# Patient Record
Sex: FEMALE | Race: WHITE | Employment: FULL TIME | URBAN - METROPOLITAN AREA
[De-identification: names, ages, dates, MRNs, and addresses within clinical notes are randomized per-mention and may not be internally consistent; named-entity substitution may affect disease eponyms.]

---

## 2023-07-25 ENCOUNTER — HOSPITAL ENCOUNTER (EMERGENCY)
Age: 28
Discharge: HOME | End: 2023-07-25
Attending: EMERGENCY MEDICINE
Payer: COMMERCIAL

## 2023-07-25 ENCOUNTER — APPOINTMENT (OUTPATIENT)
Dept: CT IMAGING | Age: 28
End: 2023-07-25
Payer: COMMERCIAL

## 2023-07-25 VITALS
TEMPERATURE: 98.4 F | BODY MASS INDEX: 32.22 KG/M2 | SYSTOLIC BLOOD PRESSURE: 128 MMHG | WEIGHT: 193.4 LBS | HEART RATE: 76 BPM | DIASTOLIC BLOOD PRESSURE: 80 MMHG | RESPIRATION RATE: 20 BRPM | HEIGHT: 65 IN | OXYGEN SATURATION: 99 %

## 2023-07-25 DIAGNOSIS — R10.11 RIGHT UPPER QUADRANT ABDOMINAL PAIN: Primary | ICD-10-CM

## 2023-07-25 LAB
ALBUMIN SERPL-MCNC: 3.5 G/DL (ref 3.5–5.2)
ALP SERPL-CCNC: 76 U/L (ref 35–104)
ALT SERPL-CCNC: 29 U/L (ref 0–32)
ANION GAP SERPL CALCULATED.3IONS-SCNC: 12 MMOL/L (ref 7–16)
AST SERPL-CCNC: 14 U/L (ref 0–31)
BACTERIA URNS QL MICRO: ABNORMAL
BASOPHILS # BLD: 0.05 K/UL (ref 0–0.2)
BASOPHILS NFR BLD: 1 % (ref 0–2)
BILIRUB SERPL-MCNC: <0.2 MG/DL (ref 0–1.2)
BILIRUB UR QL STRIP: NEGATIVE
BUN SERPL-MCNC: 6 MG/DL (ref 6–20)
CALCIUM SERPL-MCNC: 9 MG/DL (ref 8.6–10.2)
CHLORIDE SERPL-SCNC: 104 MMOL/L (ref 98–107)
CLARITY UR: CLEAR
CO2 SERPL-SCNC: 22 MMOL/L (ref 22–29)
COLOR UR: YELLOW
CREAT SERPL-MCNC: 0.6 MG/DL (ref 0.5–1)
D DIMER: 404 NG/ML DDU (ref 0–232)
EOSINOPHIL # BLD: 0.15 K/UL (ref 0.05–0.5)
EOSINOPHILS RELATIVE PERCENT: 2 % (ref 0–6)
EPI CELLS #/AREA URNS HPF: ABNORMAL /HPF
ERYTHROCYTE [DISTWIDTH] IN BLOOD BY AUTOMATED COUNT: 13.6 % (ref 11.5–15)
GFR SERPL CREATININE-BSD FRML MDRD: >60 ML/MIN/1.73M2
GLUCOSE SERPL-MCNC: 82 MG/DL (ref 74–99)
GLUCOSE UR STRIP-MCNC: NEGATIVE MG/DL
HCT VFR BLD AUTO: 31.3 % (ref 34–48)
HGB BLD-MCNC: 10.5 G/DL (ref 11.5–15.5)
HGB UR QL STRIP.AUTO: NEGATIVE
IMM GRANULOCYTES # BLD AUTO: 0.11 K/UL (ref 0–0.58)
IMM GRANULOCYTES NFR BLD: 1 % (ref 0–5)
KETONES UR STRIP-MCNC: NEGATIVE MG/DL
LEUKOCYTE ESTERASE UR QL STRIP: NEGATIVE
LIPASE SERPL-CCNC: 31 U/L (ref 13–60)
LYMPHOCYTES NFR BLD: 2.21 K/UL (ref 1.5–4)
LYMPHOCYTES RELATIVE PERCENT: 22 % (ref 20–42)
MCH RBC QN AUTO: 29 PG (ref 26–35)
MCHC RBC AUTO-ENTMCNC: 33.5 G/DL (ref 32–34.5)
MCV RBC AUTO: 86.5 FL (ref 80–99.9)
MONOCYTES NFR BLD: 0.76 K/UL (ref 0.1–0.95)
MONOCYTES NFR BLD: 8 % (ref 2–12)
NEUTROPHILS NFR BLD: 68 % (ref 43–80)
NEUTS SEG NFR BLD: 6.88 K/UL (ref 1.8–7.3)
NITRITE UR QL STRIP: NEGATIVE
PH UR STRIP: 6.5 [PH] (ref 5–9)
PLATELET # BLD AUTO: 211 K/UL (ref 130–450)
PMV BLD AUTO: 10 FL (ref 7–12)
POTASSIUM SERPL-SCNC: 3.7 MMOL/L (ref 3.5–5)
PROT SERPL-MCNC: 6.9 G/DL (ref 6.4–8.3)
PROT UR STRIP-MCNC: NEGATIVE MG/DL
RBC # BLD AUTO: 3.62 M/UL (ref 3.5–5.5)
RBC #/AREA URNS HPF: ABNORMAL /HPF
SODIUM SERPL-SCNC: 138 MMOL/L (ref 132–146)
SP GR UR STRIP: <1.005 (ref 1–1.03)
UROBILINOGEN UR STRIP-ACNC: 0.2 EU/DL (ref 0–1)
WBC #/AREA URNS HPF: ABNORMAL /HPF
WBC OTHER # BLD: 10.2 K/UL (ref 4.5–11.5)

## 2023-07-25 PROCEDURE — 71275 CT ANGIOGRAPHY CHEST: CPT

## 2023-07-25 PROCEDURE — 85379 FIBRIN DEGRADATION QUANT: CPT

## 2023-07-25 PROCEDURE — 83690 ASSAY OF LIPASE: CPT

## 2023-07-25 PROCEDURE — 81001 URINALYSIS AUTO W/SCOPE: CPT

## 2023-07-25 PROCEDURE — 80053 COMPREHEN METABOLIC PANEL: CPT

## 2023-07-25 PROCEDURE — 6360000004 HC RX CONTRAST MEDICATION: Performed by: RADIOLOGY

## 2023-07-25 PROCEDURE — 99285 EMERGENCY DEPT VISIT HI MDM: CPT

## 2023-07-25 PROCEDURE — 85027 COMPLETE CBC AUTOMATED: CPT

## 2023-07-25 RX ORDER — LIDOCAINE 50 MG/G
1 PATCH TOPICAL DAILY
Qty: 10 PATCH | Refills: 0 | Status: SHIPPED | OUTPATIENT
Start: 2023-07-25 | End: 2023-08-04

## 2023-07-25 RX ORDER — MULTIVIT-MIN/IRON/FOLIC ACID/K 18-600-40
CAPSULE ORAL
COMMUNITY

## 2023-07-25 RX ADMIN — IOPAMIDOL 75 ML: 755 INJECTION, SOLUTION INTRAVENOUS at 20:36

## 2023-07-25 ASSESSMENT — PAIN DESCRIPTION - LOCATION: LOCATION: BACK;BREAST

## 2023-07-25 ASSESSMENT — PAIN DESCRIPTION - DESCRIPTORS: DESCRIPTORS: ACHING;DISCOMFORT;SORE

## 2023-07-25 ASSESSMENT — PAIN DESCRIPTION - ORIENTATION: ORIENTATION: RIGHT

## 2023-07-25 ASSESSMENT — PAIN - FUNCTIONAL ASSESSMENT: PAIN_FUNCTIONAL_ASSESSMENT: 0-10

## 2023-07-25 ASSESSMENT — PAIN SCALES - GENERAL: PAINLEVEL_OUTOF10: 8

## 2023-07-25 NOTE — ED PROVIDER NOTES
nursing note reviewed. Constitutional:       General: She is not in acute distress. Appearance: She is not toxic-appearing. HENT:      Mouth/Throat:      Mouth: Mucous membranes are moist.   Eyes:      General: No scleral icterus. Extraocular Movements: Extraocular movements intact. Pupils: Pupils are equal, round, and reactive to light. Cardiovascular:      Rate and Rhythm: Normal rate and regular rhythm. Pulses: Normal pulses. Heart sounds: Normal heart sounds. No murmur heard. Pulmonary:      Effort: Pulmonary effort is normal. No respiratory distress. Breath sounds: Normal breath sounds. No wheezing or rales. Abdominal:      General: There is no distension. Palpations: Abdomen is soft. Tenderness: There is abdominal tenderness (RUQ). There is no right CVA tenderness, left CVA tenderness, guarding or rebound. Musculoskeletal:         General: Tenderness present. No swelling. Normal range of motion. Cervical back: Normal range of motion and neck supple. Comments: Right thoracic paraspinal ttp which also reproduces RUQ pain. No midline ttp. Skin:     General: Skin is warm and dry. Neurological:      Mental Status: She is alert and oriented to person, place, and time. Comments: Moves all extremities with appropriate strength. Sensation grossly intact in all extremities. -------------------------------------------------- RESULTS -------------------------------------------------  I have personally reviewed all laboratory and imaging results for this patient. Results are listed below.      LABS:  Labs Reviewed   CBC WITH AUTO DIFFERENTIAL - Abnormal; Notable for the following components:       Result Value    Hemoglobin 10.5 (*)     Hematocrit 31.3 (*)     All other components within normal limits   URINALYSIS WITH MICROSCOPIC - Abnormal; Notable for the following components:    Specific Gravity, UA <1.005 (*)     All other components within

## 2023-07-26 NOTE — ED NOTES
Name: Eduardo Reece  : 1995  MRN: [id number]    Date: 2023    Benefits of immediately proceeding with Radiology exam outweigh the risks and therefore the following is being waived:      [x] Pregnancy test    [] Protocol for Iodine allergy    [] MRI questionnaire    [] BUN/Creatinine        MD Sebastien Garrett MD  23

## 2023-07-26 NOTE — DISCHARGE INSTRUCTIONS
Return the emergency department if you have worsening pain, vomiting, unable to keep down food or drink, fever, black or bloody stool, vaginal bleeding or fluid leakage, or any other new or concerning symptoms. Follow-up with your OBGYN at the next available appointment.

## 2023-08-30 DIAGNOSIS — D61.9 APLASTIC ANEMIA SECONDARY TO PREGNANCY, ANTEPARTUM (HCC): ICD-10-CM

## 2023-08-30 DIAGNOSIS — O99.019 APLASTIC ANEMIA SECONDARY TO PREGNANCY, ANTEPARTUM (HCC): ICD-10-CM

## 2023-08-30 RX ORDER — ONDANSETRON 2 MG/ML
8 INJECTION INTRAMUSCULAR; INTRAVENOUS
OUTPATIENT
Start: 2023-08-31

## 2023-08-30 RX ORDER — ACETAMINOPHEN 325 MG/1
650 TABLET ORAL
OUTPATIENT
Start: 2023-08-31

## 2023-08-30 RX ORDER — HEPARIN 100 UNIT/ML
500 SYRINGE INTRAVENOUS PRN
OUTPATIENT
Start: 2023-08-31

## 2023-08-30 RX ORDER — SODIUM CHLORIDE 0.9 % (FLUSH) 0.9 %
5-40 SYRINGE (ML) INJECTION PRN
OUTPATIENT
Start: 2023-08-31

## 2023-08-30 RX ORDER — SODIUM CHLORIDE 9 MG/ML
5-250 INJECTION, SOLUTION INTRAVENOUS PRN
OUTPATIENT
Start: 2023-08-31

## 2023-08-30 RX ORDER — SODIUM CHLORIDE 9 MG/ML
INJECTION, SOLUTION INTRAVENOUS CONTINUOUS
OUTPATIENT
Start: 2023-08-31

## 2023-08-30 RX ORDER — EPINEPHRINE 1 MG/ML
0.3 INJECTION, SOLUTION, CONCENTRATE INTRAVENOUS PRN
OUTPATIENT
Start: 2023-08-31

## 2023-08-30 RX ORDER — DIPHENHYDRAMINE HYDROCHLORIDE 50 MG/ML
50 INJECTION INTRAMUSCULAR; INTRAVENOUS
OUTPATIENT
Start: 2023-08-31

## 2023-09-15 ENCOUNTER — HOSPITAL ENCOUNTER (OUTPATIENT)
Dept: INFUSION THERAPY | Age: 28
Setting detail: INFUSION SERIES
Discharge: HOME | End: 2023-09-15
Payer: COMMERCIAL

## 2023-09-15 VITALS
DIASTOLIC BLOOD PRESSURE: 78 MMHG | RESPIRATION RATE: 18 BRPM | OXYGEN SATURATION: 99 % | SYSTOLIC BLOOD PRESSURE: 145 MMHG | HEART RATE: 79 BPM | TEMPERATURE: 98 F

## 2023-09-15 DIAGNOSIS — D61.9 APLASTIC ANEMIA SECONDARY TO PREGNANCY, ANTEPARTUM (HCC): Primary | ICD-10-CM

## 2023-09-15 DIAGNOSIS — O99.019 APLASTIC ANEMIA SECONDARY TO PREGNANCY, ANTEPARTUM (HCC): Primary | ICD-10-CM

## 2023-09-15 PROCEDURE — 2580000003 HC RX 258: Performed by: OBSTETRICS & GYNECOLOGY

## 2023-09-15 PROCEDURE — 96361 HYDRATE IV INFUSION ADD-ON: CPT

## 2023-09-15 PROCEDURE — 96365 THER/PROPH/DIAG IV INF INIT: CPT

## 2023-09-15 PROCEDURE — 6360000002 HC RX W HCPCS: Performed by: OBSTETRICS & GYNECOLOGY

## 2023-09-15 RX ORDER — SODIUM CHLORIDE 9 MG/ML
5-250 INJECTION, SOLUTION INTRAVENOUS PRN
Status: CANCELLED | OUTPATIENT
Start: 2023-09-22

## 2023-09-15 RX ORDER — HEPARIN SODIUM (PORCINE) LOCK FLUSH IV SOLN 100 UNIT/ML 100 UNIT/ML
500 SOLUTION INTRAVENOUS PRN
Status: CANCELLED | OUTPATIENT
Start: 2023-09-22

## 2023-09-15 RX ORDER — DIPHENHYDRAMINE HYDROCHLORIDE 50 MG/ML
50 INJECTION INTRAMUSCULAR; INTRAVENOUS
Status: CANCELLED | OUTPATIENT
Start: 2023-09-22

## 2023-09-15 RX ORDER — ACETAMINOPHEN 325 MG/1
650 TABLET ORAL
Status: CANCELLED | OUTPATIENT
Start: 2023-09-22

## 2023-09-15 RX ORDER — SODIUM CHLORIDE 9 MG/ML
5-250 INJECTION, SOLUTION INTRAVENOUS PRN
Status: DISCONTINUED | OUTPATIENT
Start: 2023-09-15 | End: 2023-09-16 | Stop reason: HOSPADM

## 2023-09-15 RX ORDER — FERROUS SULFATE 325(65) MG
325 TABLET ORAL
COMMUNITY

## 2023-09-15 RX ORDER — EPINEPHRINE 1 MG/ML
0.3 INJECTION, SOLUTION, CONCENTRATE INTRAVENOUS PRN
Status: CANCELLED | OUTPATIENT
Start: 2023-09-22

## 2023-09-15 RX ORDER — SODIUM CHLORIDE 0.9 % (FLUSH) 0.9 %
5-40 SYRINGE (ML) INJECTION PRN
Status: DISCONTINUED | OUTPATIENT
Start: 2023-09-15 | End: 2023-09-16 | Stop reason: HOSPADM

## 2023-09-15 RX ORDER — SODIUM CHLORIDE 9 MG/ML
INJECTION, SOLUTION INTRAVENOUS CONTINUOUS
Status: CANCELLED | OUTPATIENT
Start: 2023-09-22

## 2023-09-15 RX ORDER — ONDANSETRON 2 MG/ML
8 INJECTION INTRAMUSCULAR; INTRAVENOUS
Status: CANCELLED | OUTPATIENT
Start: 2023-09-22

## 2023-09-15 RX ORDER — SODIUM CHLORIDE 0.9 % (FLUSH) 0.9 %
5-40 SYRINGE (ML) INJECTION PRN
Status: CANCELLED | OUTPATIENT
Start: 2023-09-22

## 2023-09-15 RX ADMIN — SODIUM CHLORIDE, PRESERVATIVE FREE 10 ML: 5 INJECTION INTRAVENOUS at 11:50

## 2023-09-15 RX ADMIN — FERUMOXYTOL 510 MG: 510 INJECTION INTRAVENOUS at 12:03

## 2023-09-15 RX ADMIN — SODIUM CHLORIDE 5 ML/HR: 9 INJECTION, SOLUTION INTRAVENOUS at 11:51

## 2023-09-15 RX ADMIN — SODIUM CHLORIDE, PRESERVATIVE FREE 10 ML: 5 INJECTION INTRAVENOUS at 12:25

## 2023-09-22 ENCOUNTER — HOSPITAL ENCOUNTER (OUTPATIENT)
Dept: INFUSION THERAPY | Age: 28
Setting detail: INFUSION SERIES
End: 2023-09-22
Payer: COMMERCIAL

## 2023-09-22 VITALS
RESPIRATION RATE: 18 BRPM | OXYGEN SATURATION: 97 % | TEMPERATURE: 98 F | DIASTOLIC BLOOD PRESSURE: 71 MMHG | SYSTOLIC BLOOD PRESSURE: 122 MMHG | HEART RATE: 89 BPM

## 2023-09-22 DIAGNOSIS — O99.019 APLASTIC ANEMIA SECONDARY TO PREGNANCY, ANTEPARTUM (HCC): Primary | ICD-10-CM

## 2023-09-22 DIAGNOSIS — D61.9 APLASTIC ANEMIA SECONDARY TO PREGNANCY, ANTEPARTUM (HCC): Primary | ICD-10-CM

## 2023-09-22 PROCEDURE — 96365 THER/PROPH/DIAG IV INF INIT: CPT

## 2023-09-22 PROCEDURE — 6360000002 HC RX W HCPCS: Performed by: OBSTETRICS & GYNECOLOGY

## 2023-09-22 PROCEDURE — 2580000003 HC RX 258: Performed by: OBSTETRICS & GYNECOLOGY

## 2023-09-22 RX ORDER — HEPARIN SODIUM (PORCINE) LOCK FLUSH IV SOLN 100 UNIT/ML 100 UNIT/ML
500 SOLUTION INTRAVENOUS PRN
OUTPATIENT
Start: 2023-09-22

## 2023-09-22 RX ORDER — SODIUM CHLORIDE 9 MG/ML
5-250 INJECTION, SOLUTION INTRAVENOUS PRN
OUTPATIENT
Start: 2023-09-22

## 2023-09-22 RX ORDER — SODIUM CHLORIDE 9 MG/ML
INJECTION, SOLUTION INTRAVENOUS CONTINUOUS
OUTPATIENT
Start: 2023-09-22

## 2023-09-22 RX ORDER — EPINEPHRINE 1 MG/ML
0.3 INJECTION, SOLUTION, CONCENTRATE INTRAVENOUS PRN
OUTPATIENT
Start: 2023-09-22

## 2023-09-22 RX ORDER — SODIUM CHLORIDE 0.9 % (FLUSH) 0.9 %
5-40 SYRINGE (ML) INJECTION PRN
Status: DISCONTINUED | OUTPATIENT
Start: 2023-09-22 | End: 2023-09-23 | Stop reason: HOSPADM

## 2023-09-22 RX ORDER — SODIUM CHLORIDE 9 MG/ML
5-250 INJECTION, SOLUTION INTRAVENOUS PRN
Status: DISCONTINUED | OUTPATIENT
Start: 2023-09-22 | End: 2023-09-23 | Stop reason: HOSPADM

## 2023-09-22 RX ORDER — DIPHENHYDRAMINE HYDROCHLORIDE 50 MG/ML
50 INJECTION INTRAMUSCULAR; INTRAVENOUS
OUTPATIENT
Start: 2023-09-22

## 2023-09-22 RX ORDER — ACETAMINOPHEN 325 MG/1
650 TABLET ORAL
OUTPATIENT
Start: 2023-09-22

## 2023-09-22 RX ORDER — SODIUM CHLORIDE 0.9 % (FLUSH) 0.9 %
5-40 SYRINGE (ML) INJECTION PRN
OUTPATIENT
Start: 2023-09-22

## 2023-09-22 RX ORDER — ONDANSETRON 2 MG/ML
8 INJECTION INTRAMUSCULAR; INTRAVENOUS
OUTPATIENT
Start: 2023-09-22

## 2023-09-22 RX ORDER — SODIUM CHLORIDE 9 MG/ML
5-250 INJECTION, SOLUTION INTRAVENOUS PRN
Status: CANCELLED | OUTPATIENT
Start: 2023-09-22

## 2023-09-22 RX ADMIN — FERUMOXYTOL 510 MG: 510 INJECTION INTRAVENOUS at 12:15

## 2023-09-22 RX ADMIN — Medication 10 ML: at 11:59

## 2023-09-22 NOTE — DISCHARGE INSTRUCTIONS
affect ferumoxytol, including prescription and over-the-counter medicines, vitamins, and herbal products. Tell your doctor about all other medicines you use. Where can I get more information? Your pharmacist can provide more information about ferumoxytol. Remember, keep this and all other medicines out of the reach of children, never share your medicines with others, and use this medication only for the indication prescribed. Every effort has been made to ensure that the information provided by [address] [place] [address] [employer] [facility] is accurate, up-to-date, and complete, but no guarantee is made to that effect. Drug information contained herein may be time sensitive. [facility] information has been compiled for use by healthcare practitioners and consumers in the [facility] and therefore [facility] does not warrant that uses outside of the [facility] are appropriate, unless specifically indicated otherwise. [facility]'s drug information does not endorse drugs, diagnose patients or recommend therapy. [facility] drug information is an informational resource designed to assist licensed healthcare practitioners in caring for their patients and/or to serve consumers viewing this service as a supplement to, and not a substitute for, the expertise, skill, knowledge and judgment of healthcare practitioners. The absence of a warning for a given drug or drug combination in no way should be construed to indicate that the drug or drug combination is safe, effective or appropriate for any given patient. [facility] does not assume any responsibility for any aspect of healthcare administered with the aid of information [facility] provides. The information contained herein is not intended to cover all possible uses, directions, precautions, warnings, drug interactions, allergic reactions, or adverse effects.  If you have questions about the drugs you are taking, check with your doctor, nurse or pharmacist.  Copyright 8310-5009 [address] [place] [address] 304.

## 2023-10-23 ENCOUNTER — HOSPITAL ENCOUNTER (OUTPATIENT)
Age: 28
Discharge: HOME | End: 2023-10-23
Attending: OBSTETRICS & GYNECOLOGY | Admitting: OBSTETRICS & GYNECOLOGY
Payer: COMMERCIAL

## 2023-10-23 VITALS
RESPIRATION RATE: 16 BRPM | HEIGHT: 64 IN | WEIGHT: 245 LBS | TEMPERATURE: 98.8 F | HEART RATE: 89 BPM | OXYGEN SATURATION: 99 % | BODY MASS INDEX: 41.83 KG/M2 | DIASTOLIC BLOOD PRESSURE: 86 MMHG | SYSTOLIC BLOOD PRESSURE: 131 MMHG

## 2023-10-23 PROBLEM — Z3A.39 39 WEEKS GESTATION OF PREGNANCY: Status: ACTIVE | Noted: 2023-10-23

## 2023-10-23 LAB
AMNISURE, POC: NEGATIVE
Lab: NORMAL
NEGATIVE QC PASS/FAIL: NORMAL
POSITIVE QC PASS/FAIL: NORMAL

## 2023-10-23 PROCEDURE — 99202 OFFICE O/P NEW SF 15 MIN: CPT

## 2023-10-23 PROCEDURE — 84112 EVAL AMNIOTIC FLUID PROTEIN: CPT

## 2023-10-23 RX ORDER — OMEPRAZOLE 10 MG/1
10 CAPSULE, DELAYED RELEASE ORAL 2 TIMES DAILY
COMMUNITY

## 2023-10-23 NOTE — PROGRESS NOTES
Jeremiah Phillip notified of amnisure results. OK to d/c. Patient educated on signs of labor. Patient verbalized understanding.

## 2023-10-23 NOTE — PROGRESS NOTES
Patient arrived to unit c/o possible SROM on 10/21. NO bleeding or contractions noted. Positive fetal movement. Monitors applied. Amnisure performed. Call light in reach. Plan if care discussed.

## 2023-10-24 ENCOUNTER — ANESTHESIA (OUTPATIENT)
Dept: LABOR AND DELIVERY | Age: 28
End: 2023-10-24
Payer: COMMERCIAL

## 2023-10-24 ENCOUNTER — HOSPITAL ENCOUNTER (INPATIENT)
Age: 28
LOS: 1 days | Discharge: HOME | End: 2023-10-25
Attending: OBSTETRICS & GYNECOLOGY | Admitting: OBSTETRICS & GYNECOLOGY
Payer: COMMERCIAL

## 2023-10-24 ENCOUNTER — ANESTHESIA EVENT (OUTPATIENT)
Dept: LABOR AND DELIVERY | Age: 28
End: 2023-10-24
Payer: COMMERCIAL

## 2023-10-24 DIAGNOSIS — Z37.9 VACUUM-ASSISTED VAGINAL DELIVERY: Primary | ICD-10-CM

## 2023-10-24 LAB
ABO + RH BLD: NORMAL
ALBUMIN SERPL-MCNC: 3.3 G/DL (ref 3.5–5.2)
ALP SERPL-CCNC: 133 U/L (ref 35–104)
ALT SERPL-CCNC: 13 U/L (ref 0–32)
AMNISURE, POC: POSITIVE
AMPHET UR QL SCN: NEGATIVE
ANION GAP SERPL CALCULATED.3IONS-SCNC: 13 MMOL/L (ref 7–16)
ARM BAND NUMBER: NORMAL
AST SERPL-CCNC: 15 U/L (ref 0–31)
BARBITURATES UR QL SCN: NEGATIVE
BASOPHILS # BLD: 0.05 K/UL (ref 0–0.2)
BASOPHILS NFR BLD: 1 % (ref 0–2)
BENZODIAZ UR QL: NEGATIVE
BILIRUB SERPL-MCNC: <0.2 MG/DL (ref 0–1.2)
BLOOD BANK SAMPLE EXPIRATION: NORMAL
BLOOD GROUP ANTIBODIES SERPL: NEGATIVE
BUN SERPL-MCNC: 9 MG/DL (ref 6–20)
BUPRENORPHINE UR QL: NEGATIVE
CALCIUM SERPL-MCNC: 9.1 MG/DL (ref 8.6–10.2)
CANNABINOIDS UR QL SCN: NEGATIVE
CHLORIDE SERPL-SCNC: 106 MMOL/L (ref 98–107)
CO2 SERPL-SCNC: 18 MMOL/L (ref 22–29)
COCAINE UR QL SCN: NEGATIVE
CREAT SERPL-MCNC: 0.6 MG/DL (ref 0.5–1)
EOSINOPHIL # BLD: 0.19 K/UL (ref 0.05–0.5)
EOSINOPHILS RELATIVE PERCENT: 2 % (ref 0–6)
ERYTHROCYTE [DISTWIDTH] IN BLOOD BY AUTOMATED COUNT: 16.4 % (ref 11.5–15)
FENTANYL UR QL: NEGATIVE
GFR SERPL CREATININE-BSD FRML MDRD: >60 ML/MIN/1.73M2
GLUCOSE SERPL-MCNC: 113 MG/DL (ref 74–99)
HCT VFR BLD AUTO: 34.4 % (ref 34–48)
HGB BLD-MCNC: 11.2 G/DL (ref 11.5–15.5)
IMM GRANULOCYTES # BLD AUTO: 0.1 K/UL (ref 0–0.58)
IMM GRANULOCYTES NFR BLD: 1 % (ref 0–5)
LYMPHOCYTES NFR BLD: 1.38 K/UL (ref 1.5–4)
LYMPHOCYTES RELATIVE PERCENT: 15 % (ref 20–42)
Lab: ABNORMAL
MCH RBC QN AUTO: 28.9 PG (ref 26–35)
MCHC RBC AUTO-ENTMCNC: 32.6 G/DL (ref 32–34.5)
MCV RBC AUTO: 88.9 FL (ref 80–99.9)
METHADONE UR QL: NEGATIVE
MONOCYTES NFR BLD: 0.7 K/UL (ref 0.1–0.95)
MONOCYTES NFR BLD: 8 % (ref 2–12)
NEGATIVE QC PASS/FAIL: ABNORMAL
NEUTROPHILS NFR BLD: 73 % (ref 43–80)
NEUTS SEG NFR BLD: 6.56 K/UL (ref 1.8–7.3)
OPIATES UR QL SCN: NEGATIVE
OXYCODONE UR QL SCN: NEGATIVE
PCP UR QL SCN: NEGATIVE
PLATELET # BLD AUTO: 190 K/UL (ref 130–450)
PMV BLD AUTO: 11.8 FL (ref 7–12)
POSITIVE QC PASS/FAIL: ABNORMAL
POTASSIUM SERPL-SCNC: 4.1 MMOL/L (ref 3.5–5)
PROT SERPL-MCNC: 6.6 G/DL (ref 6.4–8.3)
RBC # BLD AUTO: 3.87 M/UL (ref 3.5–5.5)
SODIUM SERPL-SCNC: 137 MMOL/L (ref 132–146)
TEST INFORMATION: NORMAL
WBC OTHER # BLD: 9 K/UL (ref 4.5–11.5)

## 2023-10-24 PROCEDURE — 86850 RBC ANTIBODY SCREEN: CPT

## 2023-10-24 PROCEDURE — 85025 COMPLETE CBC W/AUTO DIFF WBC: CPT

## 2023-10-24 PROCEDURE — 86901 BLOOD TYPING SEROLOGIC RH(D): CPT

## 2023-10-24 PROCEDURE — 6360000002 HC RX W HCPCS: Performed by: OBSTETRICS & GYNECOLOGY

## 2023-10-24 PROCEDURE — 6360000002 HC RX W HCPCS: Performed by: ANESTHESIOLOGY

## 2023-10-24 PROCEDURE — 3E0R3BZ INTRODUCTION OF ANESTHETIC AGENT INTO SPINAL CANAL, PERCUTANEOUS APPROACH: ICD-10-PCS | Performed by: ANESTHESIOLOGY

## 2023-10-24 PROCEDURE — A4216 STERILE WATER/SALINE, 10 ML: HCPCS | Performed by: ADVANCED PRACTICE MIDWIFE

## 2023-10-24 PROCEDURE — 6370000000 HC RX 637 (ALT 250 FOR IP): Performed by: ADVANCED PRACTICE MIDWIFE

## 2023-10-24 PROCEDURE — 7200000001 HC VAGINAL DELIVERY

## 2023-10-24 PROCEDURE — 80307 DRUG TEST PRSMV CHEM ANLYZR: CPT

## 2023-10-24 PROCEDURE — 86900 BLOOD TYPING SEROLOGIC ABO: CPT

## 2023-10-24 PROCEDURE — 84112 EVAL AMNIOTIC FLUID PROTEIN: CPT

## 2023-10-24 PROCEDURE — 59050 FETAL MONITOR W/REPORT: CPT

## 2023-10-24 PROCEDURE — 0DQR0ZZ REPAIR ANAL SPHINCTER, OPEN APPROACH: ICD-10-PCS | Performed by: OBSTETRICS & GYNECOLOGY

## 2023-10-24 PROCEDURE — 86592 SYPHILIS TEST NON-TREP QUAL: CPT

## 2023-10-24 PROCEDURE — 1220000001 HC SEMI PRIVATE L&D R&B

## 2023-10-24 PROCEDURE — 2580000003 HC RX 258: Performed by: ADVANCED PRACTICE MIDWIFE

## 2023-10-24 PROCEDURE — 80053 COMPREHEN METABOLIC PANEL: CPT

## 2023-10-24 PROCEDURE — 3700000025 EPIDURAL BLOCK: Performed by: ANESTHESIOLOGY

## 2023-10-24 PROCEDURE — 2500000003 HC RX 250 WO HCPCS

## 2023-10-24 PROCEDURE — 2580000003 HC RX 258: Performed by: OBSTETRICS & GYNECOLOGY

## 2023-10-24 PROCEDURE — 2500000003 HC RX 250 WO HCPCS: Performed by: ANESTHESIOLOGY

## 2023-10-24 PROCEDURE — 2500000003 HC RX 250 WO HCPCS: Performed by: ADVANCED PRACTICE MIDWIFE

## 2023-10-24 RX ORDER — SODIUM CHLORIDE, SODIUM LACTATE, POTASSIUM CHLORIDE, AND CALCIUM CHLORIDE .6; .31; .03; .02 G/100ML; G/100ML; G/100ML; G/100ML
500 INJECTION, SOLUTION INTRAVENOUS PRN
Status: DISCONTINUED | OUTPATIENT
Start: 2023-10-24 | End: 2023-10-25 | Stop reason: HOSPADM

## 2023-10-24 RX ORDER — DOCUSATE SODIUM 100 MG/1
100 CAPSULE, LIQUID FILLED ORAL 2 TIMES DAILY
Status: DISCONTINUED | OUTPATIENT
Start: 2023-10-24 | End: 2023-10-25 | Stop reason: HOSPADM

## 2023-10-24 RX ORDER — METHYLERGONOVINE MALEATE 0.2 MG/ML
200 INJECTION INTRAVENOUS PRN
Status: DISCONTINUED | OUTPATIENT
Start: 2023-10-24 | End: 2023-10-25 | Stop reason: HOSPADM

## 2023-10-24 RX ORDER — ACETAMINOPHEN 325 MG/1
650 TABLET ORAL EVERY 6 HOURS PRN
Status: DISCONTINUED | OUTPATIENT
Start: 2023-10-24 | End: 2023-10-25 | Stop reason: HOSPADM

## 2023-10-24 RX ORDER — SODIUM CHLORIDE 0.9 % (FLUSH) 0.9 %
5-40 SYRINGE (ML) INJECTION EVERY 12 HOURS SCHEDULED
Status: DISCONTINUED | OUTPATIENT
Start: 2023-10-24 | End: 2023-10-25 | Stop reason: HOSPADM

## 2023-10-24 RX ORDER — SODIUM CHLORIDE, SODIUM LACTATE, POTASSIUM CHLORIDE, AND CALCIUM CHLORIDE .6; .31; .03; .02 G/100ML; G/100ML; G/100ML; G/100ML
1000 INJECTION, SOLUTION INTRAVENOUS PRN
Status: DISCONTINUED | OUTPATIENT
Start: 2023-10-24 | End: 2023-10-25 | Stop reason: HOSPADM

## 2023-10-24 RX ORDER — ONDANSETRON 2 MG/ML
4 INJECTION INTRAMUSCULAR; INTRAVENOUS EVERY 6 HOURS PRN
Status: DISCONTINUED | OUTPATIENT
Start: 2023-10-24 | End: 2023-10-24 | Stop reason: SDUPTHER

## 2023-10-24 RX ORDER — MISOPROSTOL 200 UG/1
800 TABLET ORAL PRN
Status: DISCONTINUED | OUTPATIENT
Start: 2023-10-24 | End: 2023-10-25 | Stop reason: HOSPADM

## 2023-10-24 RX ORDER — LIDOCAINE HYDROCHLORIDE 10 MG/ML
INJECTION, SOLUTION INFILTRATION; PERINEURAL
Status: DISPENSED
Start: 2023-10-24 | End: 2023-10-24

## 2023-10-24 RX ORDER — IBUPROFEN 600 MG/1
600 TABLET ORAL EVERY 6 HOURS PRN
Status: DISCONTINUED | OUTPATIENT
Start: 2023-10-24 | End: 2023-10-25 | Stop reason: HOSPADM

## 2023-10-24 RX ORDER — ONDANSETRON 2 MG/ML
4 INJECTION INTRAMUSCULAR; INTRAVENOUS EVERY 6 HOURS PRN
Status: DISCONTINUED | OUTPATIENT
Start: 2023-10-24 | End: 2023-10-25 | Stop reason: HOSPADM

## 2023-10-24 RX ORDER — CARBOPROST TROMETHAMINE 250 UG/ML
250 INJECTION, SOLUTION INTRAMUSCULAR PRN
Status: DISCONTINUED | OUTPATIENT
Start: 2023-10-24 | End: 2023-10-25 | Stop reason: HOSPADM

## 2023-10-24 RX ORDER — SODIUM CHLORIDE, SODIUM LACTATE, POTASSIUM CHLORIDE, CALCIUM CHLORIDE 600; 310; 30; 20 MG/100ML; MG/100ML; MG/100ML; MG/100ML
INJECTION, SOLUTION INTRAVENOUS CONTINUOUS
Status: DISCONTINUED | OUTPATIENT
Start: 2023-10-24 | End: 2023-10-25 | Stop reason: HOSPADM

## 2023-10-24 RX ORDER — SODIUM CHLORIDE, SODIUM LACTATE, POTASSIUM CHLORIDE, CALCIUM CHLORIDE 600; 310; 30; 20 MG/100ML; MG/100ML; MG/100ML; MG/100ML
INJECTION, SOLUTION INTRAVENOUS
Status: ACTIVE | OUTPATIENT
Start: 2023-10-24 | End: 2023-10-25

## 2023-10-24 RX ORDER — FAMOTIDINE 20 MG/1
20 TABLET, FILM COATED ORAL 2 TIMES DAILY
COMMUNITY
Start: 2023-10-02

## 2023-10-24 RX ORDER — SODIUM CHLORIDE 9 MG/ML
INJECTION, SOLUTION INTRAVENOUS PRN
Status: DISCONTINUED | OUTPATIENT
Start: 2023-10-24 | End: 2023-10-25 | Stop reason: HOSPADM

## 2023-10-24 RX ORDER — OXYCODONE HYDROCHLORIDE 5 MG/1
5 TABLET ORAL EVERY 6 HOURS PRN
Status: DISCONTINUED | OUTPATIENT
Start: 2023-10-24 | End: 2023-10-25 | Stop reason: HOSPADM

## 2023-10-24 RX ORDER — NALOXONE HYDROCHLORIDE 0.4 MG/ML
INJECTION, SOLUTION INTRAMUSCULAR; INTRAVENOUS; SUBCUTANEOUS PRN
Status: DISCONTINUED | OUTPATIENT
Start: 2023-10-24 | End: 2023-10-25 | Stop reason: HOSPADM

## 2023-10-24 RX ORDER — SODIUM CHLORIDE 9 MG/ML
25 INJECTION, SOLUTION INTRAVENOUS PRN
Status: DISCONTINUED | OUTPATIENT
Start: 2023-10-24 | End: 2023-10-24 | Stop reason: SDUPTHER

## 2023-10-24 RX ORDER — EPHEDRINE SULFATE/0.9% NACL/PF 50 MG/5 ML
10 SYRINGE (ML) INTRAVENOUS AS NEEDED
Status: DISCONTINUED | OUTPATIENT
Start: 2023-10-24 | End: 2023-10-25 | Stop reason: HOSPADM

## 2023-10-24 RX ORDER — SODIUM CHLORIDE 0.9 % (FLUSH) 0.9 %
5-40 SYRINGE (ML) INJECTION PRN
Status: DISCONTINUED | OUTPATIENT
Start: 2023-10-24 | End: 2023-10-25 | Stop reason: HOSPADM

## 2023-10-24 RX ORDER — MODIFIED LANOLIN
OINTMENT (GRAM) TOPICAL PRN
Status: DISCONTINUED | OUTPATIENT
Start: 2023-10-24 | End: 2023-10-25 | Stop reason: HOSPADM

## 2023-10-24 RX ADMIN — Medication 2 MILLI-UNITS/MIN: at 08:41

## 2023-10-24 RX ADMIN — Medication 15 ML: at 11:16

## 2023-10-24 RX ADMIN — Medication 15 ML: at 11:29

## 2023-10-24 RX ADMIN — SODIUM CHLORIDE, POTASSIUM CHLORIDE, SODIUM LACTATE AND CALCIUM CHLORIDE: 600; 310; 30; 20 INJECTION, SOLUTION INTRAVENOUS at 10:30

## 2023-10-24 RX ADMIN — ONDANSETRON 4 MG: 2 INJECTION INTRAMUSCULAR; INTRAVENOUS at 17:21

## 2023-10-24 RX ADMIN — IBUPROFEN 600 MG: 600 TABLET ORAL at 22:38

## 2023-10-24 RX ADMIN — FAMOTIDINE 20 MG: 10 INJECTION, SOLUTION INTRAVENOUS at 17:40

## 2023-10-24 ASSESSMENT — PAIN DESCRIPTION - LOCATION: LOCATION: ABDOMEN

## 2023-10-24 ASSESSMENT — LIFESTYLE VARIABLES: SMOKING_STATUS: 0

## 2023-10-24 ASSESSMENT — PAIN DESCRIPTION - ORIENTATION: ORIENTATION: LOWER

## 2023-10-24 ASSESSMENT — PAIN SCALES - GENERAL: PAINLEVEL_OUTOF10: 4

## 2023-10-24 ASSESSMENT — PAIN DESCRIPTION - DESCRIPTORS: DESCRIPTORS: CRAMPING

## 2023-10-24 ASSESSMENT — PAIN - FUNCTIONAL ASSESSMENT: PAIN_FUNCTIONAL_ASSESSMENT: ACTIVITIES ARE NOT PREVENTED

## 2023-10-24 NOTE — PROGRESS NOTES
Pt sitting for epidural at 1057  Bolus dose given by Jeovanny Rodarte at 1116  Catheter placed by Jeovanny Rodarte at 1118  Test dose given by Jeovanny Rodarte at [id number]. Pt returned to supine position at 1125  Pump programmed by Jeovanny Rodarte at 1130    [place] placed and SVE completed at 1140.

## 2023-10-24 NOTE — ANESTHESIA PROCEDURE NOTES
Epidural Block    Patient location during procedure: OB  Reason for block: labor epidural  Staffing  Performed: resident/CRNA and other anesthesia staff   Anesthesiologist: Mackenzie Mckinney MD  Resident/CRNA: LISA Jauregui CRNA  Other anesthesia staff: Leonidas Wiley RN  Performed by: LISA Jauregui CRNA  Authorized by: Mackenzie Mckinney MD    Epidural  Patient position: sitting  Prep: Betadine and site prepped and draped  Patient monitoring: cardiac monitor, continuous pulse ox and frequent blood pressure checks  Approach: midline  Location: L3-4  Injection technique: ENDY air and ENDY saline  Provider prep: mask and sterile gloves  Needle  Needle type: Tuohy   Needle gauge: 18 G  Needle length: 3.5 in  Needle insertion depth: 6.5 cm  Catheter type: end hole  Catheter at skin depth: 10 cm  Test dose: negativeCatheter Secured: tegaderm and tape  Assessment  Sensory level: T4  Hemodynamics: stable  Attempts: 1  Outcomes: uncomplicated and patient tolerated procedure well  Preanesthetic Checklist  Completed: patient identified, IV checked, site marked, risks and benefits discussed, surgical/procedural consents, equipment checked, pre-op evaluation, timeout performed, anesthesia consent given, oxygen available and monitors applied/VS acknowledged

## 2023-10-24 NOTE — PROGRESS NOTES
39w3d    Pt arrived to the unit with c/o possible SROM. Denies bleeding. Perceives fetal movement. Placed on EFM. Positive amnisure. Dr Chong Veliz at bedside for SVE. Pt moved to LD2. Oriented to room. Call light in reach.

## 2023-10-24 NOTE — H&P
Department of Obstetrics and Gynecology  Attending Obstetrics History and Physical        CHIEF COMPLAINT:  leakage of amniotic fluid    HISTORY OF PRESENT ILLNESS:      The patient is a 33 y.o.  1 parity 0 at 39.3 weeks. Patient presents with a chief complaint as above and is being admitted for active phase labor as she had SROM at 5:00 this morning. She has irregular contractions. She has no other complaints and GBS is negative      OB History    Para Term  AB Living   1             SAB IAB Ectopic Molar Multiple Live Births                    # Outcome Date GA Lbr Kip/2nd Weight Sex Delivery Anes PTL Lv   1 Current              Past Medical History:    No past medical history on file. Past Surgical History:    No past surgical history on file. Social History:    TOBACCO:   reports that she has never smoked. She has never used smokeless tobacco.  ETOH:   reports that she does not currently use alcohol. DRUGS:   reports that she does not currently use drugs. Family History:   No family history on file. Medications Prior to Admission:  Medications Prior to Admission: omeprazole (PRILOSEC) 10 MG delayed release capsule, Take 1 capsule by mouth in the morning and at bedtime  ferrous sulfate (IRON 325) 325 (65 Fe) MG tablet, Take 1 tablet by mouth daily (with breakfast) (Patient not taking: Reported on 10/23/2023)  Cholecalciferol (VITAMIN D) 50 MCG ( UT) CAPS capsule, Take by mouth  Prenatal MV-Min-Fe Fum-FA-DHA (PRENATAL 1 PO), Take by mouth  Allergies:  Patient has no known allergies. REVIEW OF SYSTEMS:    Patient has no history of depression.   Patient has no symptoms of depression      PHYSICAL EXAM:    General appearance:  awake, alert, cooperative, no apparent distress, and appears stated age  Neurologic:  Normal DTRs  Lungs:  No increased work of breathing, good air exchange, clear to auscultation bilaterally, no crackles or wheezing  Heart:  Normal apical impulse, regular

## 2023-10-25 VITALS
TEMPERATURE: 98.4 F | SYSTOLIC BLOOD PRESSURE: 125 MMHG | RESPIRATION RATE: 16 BRPM | DIASTOLIC BLOOD PRESSURE: 83 MMHG | HEART RATE: 95 BPM | OXYGEN SATURATION: 99 %

## 2023-10-25 PROBLEM — Z37.9 NORMAL LABOR: Status: RESOLVED | Noted: 2023-10-24 | Resolved: 2023-10-25

## 2023-10-25 PROBLEM — Z37.9 VACUUM-ASSISTED VAGINAL DELIVERY: Status: ACTIVE | Noted: 2023-10-25

## 2023-10-25 LAB — RPR SER QL: NONREACTIVE

## 2023-10-25 PROCEDURE — 6360000002 HC RX W HCPCS: Performed by: ADVANCED PRACTICE MIDWIFE

## 2023-10-25 PROCEDURE — 90707 MMR VACCINE SC: CPT | Performed by: OBSTETRICS & GYNECOLOGY

## 2023-10-25 PROCEDURE — 6370000000 HC RX 637 (ALT 250 FOR IP): Performed by: ADVANCED PRACTICE MIDWIFE

## 2023-10-25 PROCEDURE — 90471 IMMUNIZATION ADMIN: CPT | Performed by: ADVANCED PRACTICE MIDWIFE

## 2023-10-25 PROCEDURE — 90715 TDAP VACCINE 7 YRS/> IM: CPT | Performed by: ADVANCED PRACTICE MIDWIFE

## 2023-10-25 PROCEDURE — 90472 IMMUNIZATION ADMIN EACH ADD: CPT | Performed by: OBSTETRICS & GYNECOLOGY

## 2023-10-25 PROCEDURE — 6360000002 HC RX W HCPCS: Performed by: OBSTETRICS & GYNECOLOGY

## 2023-10-25 RX ORDER — IBUPROFEN 600 MG/1
600 TABLET ORAL EVERY 6 HOURS PRN
Qty: 30 TABLET | Refills: 0 | Status: SHIPPED | OUTPATIENT
Start: 2023-10-25

## 2023-10-25 RX ORDER — OXYCODONE HYDROCHLORIDE AND ACETAMINOPHEN 5; 325 MG/1; MG/1
1 TABLET ORAL EVERY 6 HOURS PRN
Qty: 12 TABLET | Refills: 0 | Status: SHIPPED | OUTPATIENT
Start: 2023-10-25 | End: 2023-10-28

## 2023-10-25 RX ADMIN — TETANUS TOXOID, REDUCED DIPHTHERIA TOXOID AND ACELLULAR PERTUSSIS VACCINE, ADSORBED 0.5 ML: 5; 2.5; 8; 8; 2.5 SUSPENSION INTRAMUSCULAR at 14:24

## 2023-10-25 RX ADMIN — DOCUSATE SODIUM 100 MG: 100 CAPSULE, LIQUID FILLED ORAL at 08:14

## 2023-10-25 RX ADMIN — IBUPROFEN 600 MG: 600 TABLET ORAL at 17:45

## 2023-10-25 RX ADMIN — MEASLES, MUMPS, AND RUBELLA VIRUS VACCINE LIVE 0.5 ML: 1000; 12500; 1000 INJECTION, POWDER, LYOPHILIZED, FOR SUSPENSION SUBCUTANEOUS at 14:26

## 2023-10-25 RX ADMIN — IBUPROFEN 600 MG: 600 TABLET ORAL at 09:25

## 2023-10-25 RX ADMIN — Medication: at 11:33

## 2023-10-25 ASSESSMENT — PAIN - FUNCTIONAL ASSESSMENT: PAIN_FUNCTIONAL_ASSESSMENT: ACTIVITIES ARE NOT PREVENTED

## 2023-10-25 ASSESSMENT — PAIN DESCRIPTION - ORIENTATION: ORIENTATION: LOWER

## 2023-10-25 ASSESSMENT — PAIN SCALES - GENERAL
PAINLEVEL_OUTOF10: 6
PAINLEVEL_OUTOF10: 6

## 2023-10-25 ASSESSMENT — PAIN DESCRIPTION - DESCRIPTORS
DESCRIPTORS: CRAMPING;BURNING
DESCRIPTORS: CRAMPING

## 2023-10-25 ASSESSMENT — PAIN DESCRIPTION - LOCATION
LOCATION: ABDOMEN;PERINEUM
LOCATION: ABDOMEN

## 2023-10-25 NOTE — PROGRESS NOTES
Care assumed at bedside and POC reviewed. Patient tearful at this time regarding baby being in [facility] but states baby is doing well. Patient denies c/o other than being hungry. Call light within reach. Up ad laura and tolerating well.

## 2023-10-25 NOTE — ANESTHESIA POSTPROCEDURE EVALUATION
Department of Anesthesiology  Postprocedure Note    Patient: Angely Mccauley  MRN: [id number]  YOB: 1995  Date of evaluation: 10/25/2023      Procedure Summary     Date: 10/24/23 Room / Location:     Anesthesia Start: 1055 Anesthesia Stop: 2004    Procedure: Labor Analgesia Diagnosis:     Scheduled Providers:  Responsible Provider: Shilpa Aguayo MD    Anesthesia Type: epidural, spinal, general ASA Status: 3          Anesthesia Type: No value filed.     Laura Phase I:      Laura Phase II:        Anesthesia Post Evaluation    Patient location during evaluation: bedside  Patient participation: complete - patient participated  Level of consciousness: awake and alert  Airway patency: patent  Nausea & Vomiting: no nausea and no vomiting  Complications: no  Cardiovascular status: hemodynamically stable  Respiratory status: acceptable  Hydration status: euvolemic  Comments: Patient satisfied with epidural for labor  Pain management: adequate

## 2023-10-25 NOTE — DISCHARGE INSTRUCTIONS
Tub bath in 6 weeks  You may take a shower  Sexual activity 6 weeks  Work/School  6 weeks  Walking  As tolerated  Stairs as tolerated  Lifting no heavy lifting

## 2023-10-25 NOTE — LACTATION NOTE
Patient discussed breastfeeding, goals and expectations, resource folder provided. Patient reports having EBP at home. EBP set up in room, and demonstration provided. Home pump- Spectra use discussed. Follow up resources offered and encouraged. Patient encouraged to call for assistance with pumping, and milk storage. Reminder of milk in fridge and pumping schedule on white board.

## 2023-10-25 NOTE — DISCHARGE SUMMARY
[facility] Obstetrical Discharge Form         Patients Name  Sarah Siegel    Gestational Age:  43w2d    Antepartum complications: none    Date of Delivery:   10-24-23      Type of Delivery:   vacuum extraction    Delivered By:                 Melvi Shadow:       Information for the patient's :  Duong Garcia [[id number]]      Anesthesia:    Epidural    No results found for: \"RUBELLAIGGQT\"   B POSITIVE        Intrapartum complications: None    Feeding method:   breast    Postpartum complications: none    Discharge Meds:       Medication List        START taking these medications      ibuprofen 600 MG tablet  Commonly known as: ADVIL;MOTRIN  Take 1 tablet by mouth every 6 hours as needed for Pain     oxyCODONE-acetaminophen 5-325 MG per tablet  Commonly known as: Percocet  Take 1 tablet by mouth every 6 hours as needed for Pain for up to 3 days. Intended supply: 3 days.  Take lowest dose possible to manage pain Max Daily Amount: 4 tablets            CONTINUE taking these medications      famotidine 20 MG tablet  Commonly known as: PEPCID     omeprazole 10 MG delayed release capsule  Commonly known as: PRILOSEC     PRENATAL 1 PO     vitamin D 50 MCG ( UT) Caps capsule            STOP taking these medications      ferrous sulfate 325 (65 Fe) MG tablet  Commonly known as: IRON 325               Where to Get Your Medications        These medications were sent to [address], [address] [phone] [address] [facility],  [address] [place] [address]      Phone: [phone]   ibuprofen 600 MG tablet  oxyCODONE-acetaminophen 5-325 MG per tablet         Discharge Date:   10/25/2023  Condition:    Stable Home    Plan:   Follow up    in 6 week(s)

## 2023-10-25 NOTE — PROGRESS NOTES
CLINICAL PHARMACY NOTE: MEDS TO BEDS    Total # of Prescriptions Filled: 2   The following medications were delivered to the patient:  Percocet 5-325 mg  Ibuprofen 600 mg    Additional Documentation:

## 2023-10-25 NOTE — PLAN OF CARE
Problem: Discharge Planning  Goal: Discharge to home or other facility with appropriate resources  Outcome: Progressing     Problem: Safety - Adult  Goal: Free from fall injury  Outcome: Progressing     Problem: Infection - Adult  Goal: Absence of fever/infection during anticipated neutropenic period  Outcome: Progressing     Problem: Infection - Adult  Goal: Absence of infection during hospitalization  Outcome: Progressing     Problem: Infection - Adult  Goal: Absence of infection at discharge  Outcome: Progressing     Problem: Vaginal Birth or  Section  Goal: Fetal and maternal status remain reassuring during the birth process  Description:  Birth OB-Pregnancy care plan goal which identifies if the fetal and maternal status remain reassuring during the birth process  Outcome: Progressing

## 2023-10-25 NOTE — PROGRESS NOTES
Patients recovery period started at 2035, pt verbalizes understanding of q15min fundal checks, bleeding, and vital signs. Call light within reach.

## 2023-10-25 NOTE — PROGRESS NOTES
RN assumed care of patient at [phone]. POC discussed with patient, patient verbalized agreement. Patient brought down to nursery to visit baby.

## 2023-10-25 NOTE — PLAN OF CARE
Problem: Postpartum  Goal: Appropriate maternal -  bonding  10/25/2023 012 by Judith Day RN  Outcome: Progressing  Goal: Establishment of infant feeding pattern  10/25/2023 0124 by Judith Day RN  Outcome: Progressing

## 2023-10-25 NOTE — PROGRESS NOTES
Assumed care of patient, bedside shift report received from RN. Plan of care discussed with patient, STEVIE Diaz at bedside pushing.

## 2023-10-25 NOTE — PROGRESS NOTES
Patients recovery period complete at 2235. Shahnaz care completed and pt oriented to [id number]. Pt legs feeling weak- requesting to wait to shower. Call light within reach.

## 2023-10-25 NOTE — L&D DELIVERY NOTE
VAGINAL DELIVERY NOTE    PRE OP DX: Term pregnancy, active labor    POST OP DX:  Same plus delivery of live male infant    PROCEDURE:      ANESTHESIA:  Epidural and 1% lidocaine for repair    PLACENTA:  3VC spontaneous intact    MEDS:  None    COMPLICATIONS:  None    NUCHAL CORD:  None    SHOULDER DYSTOCIA:  None    EBL:  < 400 cc    LACERATIONS:  3rd degree perineal, repaired with 3-0 Vicryl in layers    SPECIMENS:  Cord gases    BABY:  Live male infant 8# Olivarez Churn with clear fluid and Apgars 8 an 5    Mom and baby to usual care    At approximately 615 attempted vacuum-assisted vaginal delivery. Used the Kiwi cup. There was application x3 with 2 pop-off's. Secondary to fluid contamination of the cup. There was not good progress with contraction. However maternal efforts remained very poor. Since the fetal heart rate tracing was reassuring and category 1 allow the patient to continue to labor and hopefully encourage her to increase maternal efforts. In addition on examination there seems to be adequate room in the pelvis to accommodate vaginal delivery. At approximately 8:00 returned to the room and she had been continuously pushing since then she was no  +3 station. Small midline episiotomy was made and the patient delivered shortly after. No shoulder dystocia was encountered. The baby was vigorous at delivery.     Raz Juarez MD  10/24/2023